# Patient Record
Sex: MALE | URBAN - METROPOLITAN AREA
[De-identification: names, ages, dates, MRNs, and addresses within clinical notes are randomized per-mention and may not be internally consistent; named-entity substitution may affect disease eponyms.]

---

## 2017-02-03 ENCOUNTER — TELEPHONE (OUTPATIENT)
Dept: UROLOGY | Facility: CLINIC | Age: 47
End: 2017-02-03

## 2017-02-03 ENCOUNTER — PRE VISIT (OUTPATIENT)
Dept: UROLOGY | Facility: CLINIC | Age: 47
End: 2017-02-03

## 2017-02-03 NOTE — TELEPHONE ENCOUNTER
Pt has not had SA. Provided him with andrology scheduling number. Message sent to [facility] for SA order. killian

## 2017-02-03 NOTE — TELEPHONE ENCOUNTER
----- Message from Petra Oliver sent at 2/3/2017  8:54 AM CST -----  Regarding: SA order please!  This patient has not had a Semen analysis done yet, Could I please get an order?!?!    Much appreciated  Petra

## 2017-02-03 NOTE — TELEPHONE ENCOUNTER
----- Message from Petra Oliver sent at 2/3/2017  9:09 AM CST -----  Regarding: please disregard message on this pt  He does not need a SA order anymore! Thanks :)    Petra

## 2017-02-06 ENCOUNTER — TRANSFERRED RECORDS (OUTPATIENT)
Dept: HEALTH INFORMATION MANAGEMENT | Facility: CLINIC | Age: 47
End: 2017-02-06

## 2017-02-09 ENCOUNTER — OFFICE VISIT (OUTPATIENT)
Dept: UROLOGY | Facility: CLINIC | Age: 47
End: 2017-02-09

## 2017-02-09 VITALS
WEIGHT: 180 LBS | SYSTOLIC BLOOD PRESSURE: 136 MMHG | BODY MASS INDEX: 28.93 KG/M2 | HEART RATE: 66 BPM | DIASTOLIC BLOOD PRESSURE: 90 MMHG | HEIGHT: 66 IN

## 2017-02-09 DIAGNOSIS — F52.4 PREMATURE EJACULATION: ICD-10-CM

## 2017-02-09 DIAGNOSIS — N52.9 ERECTILE DYSFUNCTION, UNSPECIFIED ERECTILE DYSFUNCTION TYPE: ICD-10-CM

## 2017-02-09 DIAGNOSIS — Z31.41 FERTILITY TESTING: Primary | ICD-10-CM

## 2017-02-09 DIAGNOSIS — R86.8 TERATOSPERMIA: ICD-10-CM

## 2017-02-09 DIAGNOSIS — R86.8 ASTHENOSPERMIA: ICD-10-CM

## 2017-02-09 LAB
FSH SERPL-ACNC: 3.3 IU/L (ref 0.7–10.8)
PROLACTIN SERPL-MCNC: 6 UG/L (ref 2–18)

## 2017-02-09 RX ORDER — CLOMIPRAMINE HYDROCHLORIDE 25 MG/1
25-50 CAPSULE ORAL DAILY PRN
Qty: 20 CAPSULE | Refills: 11 | Status: SHIPPED | OUTPATIENT
Start: 2017-02-09

## 2017-02-09 RX ORDER — SILDENAFIL CITRATE 20 MG/1
20-40 TABLET ORAL DAILY PRN
Qty: 30 TABLET | Refills: 12 | Status: SHIPPED | OUTPATIENT
Start: 2017-02-09

## 2017-02-09 ASSESSMENT — PAIN SCALES - GENERAL: PAINLEVEL: NO PAIN (0)

## 2017-02-09 NOTE — Clinical Note
2/9/2017       RE: Michael Chen  [address]  [place] [address]     Dear Colleague,    Thank you for referring your patient, Michael Chen, to the [facility] UROLOGY AND INST FOR PROSTATE AND UROLOGIC CANCERS at [facility]. Please see a copy of my visit note below.    New patient, self referred. For erectile dysfunction and fertility.    HPI  Michael Chen is a 46 year old male with no significant PMH.  He and his partner have been attempting to conceive for the last 10-15yrs.  They have no previous pregnancy together.  He have had 1 pregnacy with another partner. They have not tried IUI or IVF.  They do not use lubrication during intercourse.    He also have been having new ED that stared January 20th. haven't had any problem like this before. He isnt able to achieve any erection (0/10) although he still has nocturnal and morning erections. He didn't start any medication or take any at baseline, cannot relate it to any inciting factor, havent tried anything for it. He also have a concern of premature ejaculation before he had ED. He lasts anytime between 1-5 minutes, have had it for several years, didn't try anything fr it.     The patient's spouse is 39 years old.  She is in good health.  She has never been pregnant.  She has been evaluated for infertility and her work-up showed obstructed fallopian tube, likely due to her fibromyomectomy in the past.      PAST MEDICAL HISTORY:    Normal  Puberty normal   No associated conditions such as ED or sexual dysfunction.  Premature ejaculation   Erectile dysfunction     PAST SURG HISTORY  none    Medications as of 2/9/2017:  none    ALLERGY:   No Known Allergies    SOCIAL HISTORY:   . Visitor from [place] for 2-3 weeks.  No alcohol abuse, no tobacco use.   Social History   Substance Use Topics     Smoking status: Never Smoker      Smokeless tobacco: Not on file     Alcohol Use: 0.0 oz/week     0 Standard drinks or  "equivalent per week         FAMILY HISTORY: no inherited disorders.     REVIEW OF SYSTEMS:.    Answers for HPI/ROS submitted by the patient on 2/9/2017   General Symptoms: No  Skin Symptoms: No  HENT Symptoms: No  EYE SYMPTOMS: No  HEART SYMPTOMS: No  LUNG SYMPTOMS: No  INTESTINAL SYMPTOMS: No    Denies testicular pain. No vision or smell deficits, no chronic sinus or respiratory infections. No recent febrile illness, weight loss. No heat or cold intolerance, gynecomastia, or other endocrine complaints.    Otherwise, no constitutional, eye, ENT, heart, lung, GI , musculoskeletal, skin, neurologic, psychiatric, or hematologic complaints.    GONADOTOXIN EXPOSURE: Unremarkable . Otherwise negative for marijuana, heat, chemicals, pesticides, heavy metals, steroids, chemotherapy or radiation.    GENERAL PHYSICAL EXAM  /90 mmHg  Pulse 66  Ht 1.676 m (5' 6\")  Wt 81.647 kg (180 lb)  BMI 29.07 kg/m2   Constitutional: No acute distress. Well nourished.   PSYCH: normal mood and affect.  NEURO: normal gait, no focal deficits.   EYES: anicteric, EOMI, PERR  ENT: neck supple,  mucosae moist, no thrush.  CARDIOPULMONARY: breathing non-labored, pulse regular, no peripheral edema.  GI: Abdomen soft, non-tender, no  surgical scars, no organomegaly.  MUSCULOSKELETAL: normal limb proportions, no muscle wasting, no contractures.  SKIN: Normal virilized hair distribution, no lesions, warts or rashes over genitalia, abdomen extremities or face.  HEME/LYMPH: no ecchymosis, no lymphadenopathy in groin or neck, no lymphedema.     EXAM:  Phallus circumcised, meatus adequate, no plaques palpated.   Left testis descended , size is 18 cc , consistency is soft. No intra-testicular masses.   Right testis descended , size is 18 cc , consistency is soft. No intra-testicular masses.   Epididymes present, non-tender, non enlarged.   Left cord: Vas present. no varicocele noted.  Right cord: Vas present. no varicocele noted.     Rectal exam " deferred.     LABS:     Semen Analysis:  (normal range in parenthesis)   -Volume: 4.5 ml (1.5-5.0)   -pH: 7.6 (>7.2)   -Concentration: 24.8 million/ml (>15 million/ml)   -% Forward progressive: 9% (>30%)   -Total progressive motile count: 10 million (>15.6 million)   -% Normal morphology: 2% (>4%)    ASSESSMENT:    testis hypofunction: severe asthenospermia, teratospermia.    Female factor     Erectile dysfunction, work up pending    Premature ejaculation    No varicoceles     PLAN:    Hormonal panel including testosterone, prolactin, and FSH for ED and infertility     Repeat SA     Viagra prescription, how to use and possible side effects discussed      Discussed that will probably need IVF because of female factor     Try Clomipramine and promescent spray for PE      Patient was seen and examined with Dr. Leland Ceja MD  Urology PGY2  [phone]    I saw and examined the patient with the resident today.  I agree with the resident note and plan of care as above.   Premature ejaculation. Discussed options of condoms, topical anesthetic agent, sensitization techniques, or off-label medication use (SSRI, clomipramine, tramadol, PDE5i).  He understands there are no FDA approved medications specifically for PE but that many have been used off-label and have shown efficacy.  Discussed possibility of rebound depression if he were to stop an antidepressant suddenly.    Jacob Ha MD  Urology Staff               Again, thank you for allowing me to participate in the care of your patient.      Sincerely,    Jacob Ha MD

## 2017-02-09 NOTE — PROGRESS NOTES
New patient, self referred. For erectile dysfunction and fertility.    HPI  Michael Chen is a 46 year old male with no significant PMH.  He and his partner have been attempting to conceive for the last 10-15yrs.  They have no previous pregnancy together.  He have had 1 pregnacy with another partner. They have not tried IUI or IVF.  They do not use lubrication during intercourse.    He also have been having new ED that stared January 20th. haven't had any problem like this before. He isnt able to achieve any erection (0/10) although he still has nocturnal and morning erections. He didn't start any medication or take any at baseline, cannot relate it to any inciting factor, havent tried anything for it. He also have a concern of premature ejaculation before he had ED. He lasts anytime between 1-5 minutes, have had it for several years, didn't try anything fr it.     The patient's spouse is 39 years old.  She is in good health.  She has never been pregnant.  She has been evaluated for infertility and her work-up showed obstructed fallopian tube, likely due to her fibromyomectomy in the past.      PAST MEDICAL HISTORY:    Normal  Puberty normal   No associated conditions such as ED or sexual dysfunction.  Premature ejaculation   Erectile dysfunction     PAST SURG HISTORY  none    Medications as of 2/9/2017:  none    ALLERGY:   No Known Allergies    SOCIAL HISTORY:   . Visitor from [place] for 2-3 weeks.  No alcohol abuse, no tobacco use.   Social History   Substance Use Topics     Smoking status: Never Smoker      Smokeless tobacco: Not on file     Alcohol Use: 0.0 oz/week     0 Standard drinks or equivalent per week         FAMILY HISTORY: no inherited disorders.     REVIEW OF SYSTEMS:.    Answers for HPI/ROS submitted by the patient on 2/9/2017   General Symptoms: No  Skin Symptoms: No  HENT Symptoms: No  EYE SYMPTOMS: No  HEART SYMPTOMS: No  LUNG SYMPTOMS: No  INTESTINAL SYMPTOMS: No    Denies testicular pain.  "No vision or smell deficits, no chronic sinus or respiratory infections. No recent febrile illness, weight loss. No heat or cold intolerance, gynecomastia, or other endocrine complaints.    Otherwise, no constitutional, eye, ENT, heart, lung, GI , musculoskeletal, skin, neurologic, psychiatric, or hematologic complaints.    GONADOTOXIN EXPOSURE: Unremarkable . Otherwise negative for marijuana, heat, chemicals, pesticides, heavy metals, steroids, chemotherapy or radiation.    GENERAL PHYSICAL EXAM  /90 mmHg  Pulse 66  Ht 1.676 m (5' 6\")  Wt 81.647 kg (180 lb)  BMI 29.07 kg/m2   Constitutional: No acute distress. Well nourished.   PSYCH: normal mood and affect.  NEURO: normal gait, no focal deficits.   EYES: anicteric, EOMI, PERR  ENT: neck supple,  mucosae moist, no thrush.  CARDIOPULMONARY: breathing non-labored, pulse regular, no peripheral edema.  GI: Abdomen soft, non-tender, no  surgical scars, no organomegaly.  MUSCULOSKELETAL: normal limb proportions, no muscle wasting, no contractures.  SKIN: Normal virilized hair distribution, no lesions, warts or rashes over genitalia, abdomen extremities or face.  HEME/LYMPH: no ecchymosis, no lymphadenopathy in groin or neck, no lymphedema.     EXAM:  Phallus circumcised, meatus adequate, no plaques palpated.   Left testis descended , size is 18 cc , consistency is soft. No intra-testicular masses.   Right testis descended , size is 18 cc , consistency is soft. No intra-testicular masses.   Epididymes present, non-tender, non enlarged.   Left cord: Vas present. no varicocele noted.  Right cord: Vas present. no varicocele noted.     Rectal exam deferred.     LABS:     Semen Analysis:  (normal range in parenthesis)   -Volume: 4.5 ml (1.5-5.0)   -pH: 7.6 (>7.2)   -Concentration: 24.8 million/ml (>15 million/ml)   -% Forward progressive: 9% (>30%)   -Total progressive motile count: 10 million (>15.6 million)   -% Normal morphology: 2% " (>4%)    ASSESSMENT:    testis hypofunction: severe asthenospermia, teratospermia.    Female factor     Erectile dysfunction, work up pending    Premature ejaculation    No varicoceles     PLAN:    Hormonal panel including testosterone, prolactin, and FSH for ED and infertility     Repeat SA     Viagra prescription, how to use and possible side effects discussed      Discussed that will probably need IVF because of female factor     Try Clomipramine and promescent spray for PE      Patient was seen and examined with Dr. Leland Ceja MD  Urology PGY2  [phone]    I saw and examined the patient with the resident today.  I agree with the resident note and plan of care as above.   Premature ejaculation. Discussed options of condoms, topical anesthetic agent, sensitization techniques, or off-label medication use (SSRI, clomipramine, tramadol, PDE5i).  He understands there are no FDA approved medications specifically for PE but that many have been used off-label and have shown efficacy.  Discussed possibility of rebound depression if he were to stop an antidepressant suddenly.    Jacob Ha MD  Urology Staff

## 2017-02-09 NOTE — LETTER
Date:February 13, 2017      Patient was self referred, no letter generated. Do not send.        [facility] Physicians Health Information

## 2017-02-09 NOTE — MR AVS SNAPSHOT
After Visit Summary   2/9/2017    Michael Chen    MRN: [id number]           Patient Information     Date Of Birth          1970        Visit Information        Provider Department      2/9/2017 10:20 AM Jacob Ha MD [facility] Urology and [facility] for Prostate and Urologic Cancers        Today's Diagnoses     Fertility testing    -  1     Erectile dysfunction, unspecified erectile dysfunction type         Premature ejaculation           Care Instructions    Complete lab work today  Schedule/complete semen analysis  Follow up with Dr Ha to discuss results after you complete testing    It was a pleasure meeting with you today.  Thank you for allowing me and my team the privilege of caring for you today.  YOU are the reason we are here, and I truly hope we provided you with the excellent service you deserve.  Please let us know if there is anything else we can do for you so that we can be sure you are leaving completely satisfied with your care experience.                Follow-ups after your visit        Your next 10 appointments already scheduled     Feb 09, 2017 11:30 AM   LAB with [facility] ([facility])    [address] [place] [id number] [phone]           Patient must bring picture ID.  Patient should be prepared to give a urine specimen  Please do not eat 10-12 hours before your appointment if you are coming in fasting for labs on lipids, cholesterol, or glucose (sugar).  Pregnant women should follow their Care Team instructions. Water with medications is okay. Do not drink coffee or other fluids.   If you have concerns about taking  your medications, please ask at office or if scheduling via [employer], send a message by clicking on Secure Messaging, Message Your Care Team.              Future tests that were ordered for you today     Open Future Orders        Priority Expected Expires Ordered    Semen Analysis, Strict  "Morphology (SOUMYA) Routine 2017            Who to contact     Please call your clinic at [phone] to:    Ask questions about your health    Make or cancel appointments    Discuss your medicines    Learn about your test results    Speak to your doctor   If you have compliments or concerns about an experience at your clinic, or if you wish to file a complaint, please contact [facility] Patient Relations at [phone] or email us at [email or web address]         Additional Information About Your Visit        [employer] Information     [employer] is an electronic gateway that provides easy, online access to your medical records. With [employer], you can request a clinic appointment, read your test results, renew a prescription or communicate with your care team.     To sign up for [employer] visit the website at [email or web address]   You will be asked to enter the access code listed below, as well as some personal information. Please follow the directions to create your username and password.     Your access code is: [email or web address]  Expires: 5/3/2017  6:30 AM     Your access code will  in 90 days. If you need help or a new code, please contact your [facility] or call [phone] for assistance.        Care EveryWhere ID     This is your Care EveryWhere ID. This could be used by other organizations to access your [facility] medical records  [id number]        Your Vitals Were     Pulse Height BMI (Body Mass Index)             66 1.676 m (5' 6\") 29.07 kg/m2          Blood Pressure from Last 3 Encounters:   17 136/90    Weight from Last 3 Encounters:   17 81.647 kg (180 lb)              We Performed the Following     Follicle stimulating hormone     Prolactin     Testosterone Free and Total          Today's Medication Changes          These changes are accurate as of: 17 11:12 AM.  If you have any questions, ask " your nurse or doctor.               Start taking these medicines.        Dose/Directions    clomiPRAMINE 25 MG capsule   Commonly known as:  ANAFRANIL   Used for:  Premature ejaculation   Started by:  Jacbo Ha MD        Dose:  25-50 mg   Take 1-2 capsules (25-50 mg) by mouth daily as needed (Take at least 4 hours before intercourse.)   Quantity:  20 capsule   Refills:  11       sildenafil 20 MG tablet   Commonly known as:  REVATIO/VIAGRA   Used for:  Erectile dysfunction, unspecified erectile dysfunction type   Started by:  Jacob Ha MD        Dose:  20-40 mg   Take 1-2 tablets (20-40 mg) by mouth daily as needed May increase up to 100mg dose ( 5 tablets) if needed.  Use lowest effective dose.   Quantity:  30 tablet   Refills:  12            Where to get your medicines      Some of these will need a paper prescription and others can be bought over the counter.  Ask your nurse if you have questions.     Bring a paper prescription for each of these medications    - clomiPRAMINE 25 MG capsule  - sildenafil 20 MG tablet             Primary Care Provider    None Specified       No primary provider on file.        Thank you!     Thank you for choosing [facility] UROLOGY AND [facility] FOR PROSTATE AND UROLOGIC CANCERS  for your care. Our goal is always to provide you with excellent care. Hearing back from our patients is one way we can continue to improve our services. Please take a few minutes to complete the written survey that you may receive in the mail after your visit with us. Thank you!             Your Updated Medication List - Protect others around you: Learn how to safely use, store and throw away your medicines at www.disposemymeds.org.          This list is accurate as of: 2/9/17 11:12 AM.  Always use your most recent med list.                   Brand Name Dispense Instructions for use    clomiPRAMINE 25 MG capsule    ANAFRANIL    20 capsule    Take 1-2 capsules (25-50 mg) by mouth daily as needed  (Take at least 4 hours before intercourse.)       sildenafil 20 MG tablet    REVATIO/VIAGRA    30 tablet    Take 1-2 tablets (20-40 mg) by mouth daily as needed May increase up to 100mg dose ( 5 tablets) if needed.  Use lowest effective dose.

## 2017-02-09 NOTE — NURSING NOTE
Chief Complaint   Patient presents with     Consult     infertility/erectile dysfunction      Jairo LOMAS

## 2017-02-09 NOTE — PATIENT INSTRUCTIONS
Complete lab work today  Schedule/complete semen analysis  Follow up with Dr Ha to discuss results after you complete testing    It was a pleasure meeting with you today.  Thank you for allowing me and my team the privilege of caring for you today.  YOU are the reason we are here, and I truly hope we provided you with the excellent service you deserve.  Please let us know if there is anything else we can do for you so that we can be sure you are leaving completely satisfied with your care experience.

## 2017-02-10 LAB
SHBG SERPL-SCNC: 42 NMOL/L (ref 11–80)
TESTOST FREE SERPL-MCNC: 7.5 NG/DL (ref 4.7–24.4)
TESTOST SERPL-MCNC: 426 NG/DL (ref 240–950)

## 2017-02-13 DIAGNOSIS — Z31.41 FERTILITY TESTING: ICD-10-CM

## 2017-02-13 PROCEDURE — 89322 SEMEN ANAL STRICT CRITERIA: CPT

## 2017-02-15 LAB
ABNORMAL SPERM: 99 MORPHOLOGY
ABSTINENCE DAYS: 5 DAYS (ref 2–7)
AGGLUTINATION: ABNORMAL YES/NO
ANALYSIS TEMP - CENTIGRADE: 22 CENTIGRADE
CELL FRAGMENTS: ABNORMAL %
COLLECTION METHOD: ABNORMAL
COLLECTION SITE: ABNORMAL
CONSENT TO RELEASE TO PARTNER: NO
HEAD DEFECT: 99
IMMATURE SPERM: ABNORMAL %
IMMOTILE: 75 %
LAB RECEIPT TIME: ABNORMAL
LIQUEFIED: ABNORMAL YES/NO
MIDPIECE DEFECT: 48
NON-PROGRESSIVE MOTILITY: 6 %
NORMAL SPERM: 1 % NORMAL FORMS (ref 4–?)
PROGRESSIVE MOTILITY: 19 % (ref 32–?)
ROUND CELLS: 0.1 MILLION/ML (ref ?–2)
SPECIMEN CONCENTRATION: 57 MILLION/ML (ref 15–?)
SPECIMEN PH: 8 PH (ref 7.2–?)
SPECIMEN TYPE: ABNORMAL
SPECIMEN VOL UR: 4.8 ML (ref 1.5–?)
TAIL DEFECT: 24
TIME OF ANALYSIS: ABNORMAL
TOTAL NUMBER: 274 MILLION (ref 39–?)
TOTAL PROGRESSIVE MOTILE: 52 MILLION (ref 15.6–?)
VISCOUS: ABNORMAL YES/NO
VITALITY: 57 % (ref 58–?)
WBC SPECIMEN: ABNORMAL %

## 2017-02-16 ENCOUNTER — TELEPHONE (OUTPATIENT)
Dept: UROLOGY | Facility: CLINIC | Age: 47
End: 2017-02-16

## 2017-02-16 NOTE — TELEPHONE ENCOUNTER
He was unable to make his appointment in the morning and wants his results emailed to him  ellen is fine. Karina Mg, SH[facility] Staff Nurse

## 2017-02-16 NOTE — TELEPHONE ENCOUNTER
----- Message from Kaley Gray sent at 2/16/2017  4:16 PM CST -----  Regarding: Pt reschedule with Dr. Ha  Contact: [phone]  Return pt for Dr. Ha needed to cancel his appt for tomorrow morning at 7 am because he will not have a car. I told him our next available was 03/09/2017 but he stated that he cannot wait that long because he will be going back to [place] in a few weeks. Please call pt to discuss options. Thanks.    MB      Please DO NOT send this message and/or reply back to sender.  Call Center Representatives DO NOT respond to messages.